# Patient Record
Sex: MALE | Race: WHITE | Employment: UNEMPLOYED | URBAN - METROPOLITAN AREA
[De-identification: names, ages, dates, MRNs, and addresses within clinical notes are randomized per-mention and may not be internally consistent; named-entity substitution may affect disease eponyms.]

---

## 2022-08-23 ENCOUNTER — APPOINTMENT (OUTPATIENT)
Dept: CT IMAGING | Age: 42
End: 2022-08-23
Payer: COMMERCIAL

## 2022-08-23 ENCOUNTER — HOSPITAL ENCOUNTER (OUTPATIENT)
Age: 42
Setting detail: OBSERVATION
Discharge: HOME | End: 2022-08-24
Attending: EMERGENCY MEDICINE | Admitting: INTERNAL MEDICINE
Payer: COMMERCIAL

## 2022-08-23 ENCOUNTER — APPOINTMENT (OUTPATIENT)
Dept: GENERAL RADIOLOGY | Age: 42
End: 2022-08-23
Payer: COMMERCIAL

## 2022-08-23 DIAGNOSIS — R55 SYNCOPE AND COLLAPSE: Primary | ICD-10-CM

## 2022-08-23 LAB
ALBUMIN SERPL-MCNC: 4.8 G/DL (ref 3.5–4.6)
ALP BLD-CCNC: 92 U/L (ref 35–104)
ALT SERPL-CCNC: 52 U/L (ref 0–41)
ANION GAP SERPL CALCULATED.3IONS-SCNC: 9 MEQ/L (ref 9–15)
AST SERPL-CCNC: 27 U/L (ref 0–40)
BASOPHILS ABSOLUTE: 0 K/UL (ref 0–0.2)
BASOPHILS RELATIVE PERCENT: 0.4 %
BILIRUB SERPL-MCNC: 0.8 MG/DL (ref 0.2–0.7)
BUN BLDV-MCNC: 15 MG/DL (ref 6–20)
CALCIUM SERPL-MCNC: 9.1 MG/DL (ref 8.5–9.9)
CHLORIDE BLD-SCNC: 100 MEQ/L (ref 95–107)
CO2: 25 MEQ/L (ref 20–31)
CREAT SERPL-MCNC: 1.12 MG/DL (ref 0.7–1.2)
EOSINOPHILS ABSOLUTE: 0 K/UL (ref 0–0.7)
EOSINOPHILS RELATIVE PERCENT: 0.4 %
GFR AFRICAN AMERICAN: >60
GFR NON-AFRICAN AMERICAN: >60
GLOBULIN: 2.4 G/DL (ref 2.3–3.5)
GLUCOSE BLD-MCNC: 145 MG/DL (ref 70–99)
HCT VFR BLD CALC: 43.2 % (ref 42–52)
HEMOGLOBIN: 15 G/DL (ref 14–18)
LV EF: 55 %
LVEF MODALITY: NORMAL
LYMPHOCYTES ABSOLUTE: 0.5 K/UL (ref 1–4.8)
LYMPHOCYTES RELATIVE PERCENT: 8.4 %
MCH RBC QN AUTO: 29.4 PG (ref 27–31.3)
MCHC RBC AUTO-ENTMCNC: 34.7 % (ref 33–37)
MCV RBC AUTO: 84.9 FL (ref 80–100)
MONOCYTES ABSOLUTE: 0.5 K/UL (ref 0.2–0.8)
MONOCYTES RELATIVE PERCENT: 7.4 %
NEUTROPHILS ABSOLUTE: 5.1 K/UL (ref 1.4–6.5)
NEUTROPHILS RELATIVE PERCENT: 83.4 %
PDW BLD-RTO: 13.8 % (ref 11.5–14.5)
PLATELET # BLD: 204 K/UL (ref 130–400)
POTASSIUM SERPL-SCNC: 4.5 MEQ/L (ref 3.4–4.9)
RBC # BLD: 5.09 M/UL (ref 4.7–6.1)
SODIUM BLD-SCNC: 134 MEQ/L (ref 135–144)
TOTAL PROTEIN: 7.2 G/DL (ref 6.3–8)
TROPONIN: <0.01 NG/ML (ref 0–0.01)
WBC # BLD: 6.1 K/UL (ref 4.8–10.8)

## 2022-08-23 PROCEDURE — G0378 HOSPITAL OBSERVATION PER HR: HCPCS

## 2022-08-23 PROCEDURE — 85025 COMPLETE CBC W/AUTO DIFF WBC: CPT

## 2022-08-23 PROCEDURE — 96372 THER/PROPH/DIAG INJ SC/IM: CPT

## 2022-08-23 PROCEDURE — 80053 COMPREHEN METABOLIC PANEL: CPT

## 2022-08-23 PROCEDURE — 93005 ELECTROCARDIOGRAM TRACING: CPT | Performed by: EMERGENCY MEDICINE

## 2022-08-23 PROCEDURE — 93306 TTE W/DOPPLER COMPLETE: CPT

## 2022-08-23 PROCEDURE — 36415 COLL VENOUS BLD VENIPUNCTURE: CPT

## 2022-08-23 PROCEDURE — 71045 X-RAY EXAM CHEST 1 VIEW: CPT

## 2022-08-23 PROCEDURE — 84484 ASSAY OF TROPONIN QUANT: CPT

## 2022-08-23 PROCEDURE — 2580000003 HC RX 258: Performed by: INTERNAL MEDICINE

## 2022-08-23 PROCEDURE — 99285 EMERGENCY DEPT VISIT HI MDM: CPT

## 2022-08-23 PROCEDURE — 72125 CT NECK SPINE W/O DYE: CPT

## 2022-08-23 PROCEDURE — 99220 PR INITIAL OBSERVATION CARE/DAY 70 MINUTES: CPT | Performed by: INTERNAL MEDICINE

## 2022-08-23 PROCEDURE — 6360000002 HC RX W HCPCS: Performed by: INTERNAL MEDICINE

## 2022-08-23 PROCEDURE — 96361 HYDRATE IV INFUSION ADD-ON: CPT

## 2022-08-23 PROCEDURE — 70450 CT HEAD/BRAIN W/O DYE: CPT

## 2022-08-23 PROCEDURE — 96360 HYDRATION IV INFUSION INIT: CPT

## 2022-08-23 RX ORDER — SODIUM CHLORIDE 9 MG/ML
INJECTION, SOLUTION INTRAVENOUS CONTINUOUS
Status: DISPENSED | OUTPATIENT
Start: 2022-08-23 | End: 2022-08-24

## 2022-08-23 RX ORDER — ONDANSETRON 4 MG/1
4 TABLET, ORALLY DISINTEGRATING ORAL EVERY 8 HOURS PRN
Status: DISCONTINUED | OUTPATIENT
Start: 2022-08-23 | End: 2022-08-24 | Stop reason: HOSPADM

## 2022-08-23 RX ORDER — SODIUM CHLORIDE 9 MG/ML
INJECTION, SOLUTION INTRAVENOUS PRN
Status: DISCONTINUED | OUTPATIENT
Start: 2022-08-23 | End: 2022-08-24 | Stop reason: HOSPADM

## 2022-08-23 RX ORDER — PHENTERMINE HYDROCHLORIDE 37.5 MG/1
37.5 TABLET ORAL
Status: ON HOLD | COMMUNITY
End: 2022-08-24 | Stop reason: HOSPADM

## 2022-08-23 RX ORDER — SODIUM CHLORIDE 0.9 % (FLUSH) 0.9 %
5-40 SYRINGE (ML) INJECTION PRN
Status: DISCONTINUED | OUTPATIENT
Start: 2022-08-23 | End: 2022-08-24 | Stop reason: HOSPADM

## 2022-08-23 RX ORDER — SODIUM CHLORIDE 0.9 % (FLUSH) 0.9 %
5-40 SYRINGE (ML) INJECTION EVERY 12 HOURS SCHEDULED
Status: DISCONTINUED | OUTPATIENT
Start: 2022-08-23 | End: 2022-08-24 | Stop reason: HOSPADM

## 2022-08-23 RX ORDER — ONDANSETRON 2 MG/ML
4 INJECTION INTRAMUSCULAR; INTRAVENOUS EVERY 6 HOURS PRN
Status: DISCONTINUED | OUTPATIENT
Start: 2022-08-23 | End: 2022-08-24 | Stop reason: HOSPADM

## 2022-08-23 RX ORDER — ENOXAPARIN SODIUM 100 MG/ML
30 INJECTION SUBCUTANEOUS 2 TIMES DAILY
Status: DISCONTINUED | OUTPATIENT
Start: 2022-08-23 | End: 2022-08-24 | Stop reason: HOSPADM

## 2022-08-23 RX ORDER — ACETAMINOPHEN 325 MG/1
650 TABLET ORAL EVERY 4 HOURS PRN
Status: DISCONTINUED | OUTPATIENT
Start: 2022-08-23 | End: 2022-08-24 | Stop reason: HOSPADM

## 2022-08-23 RX ADMIN — ENOXAPARIN SODIUM 30 MG: 100 INJECTION SUBCUTANEOUS at 17:46

## 2022-08-23 RX ADMIN — SODIUM CHLORIDE: 9 INJECTION, SOLUTION INTRAVENOUS at 17:49

## 2022-08-23 RX ADMIN — SODIUM CHLORIDE, PRESERVATIVE FREE 10 ML: 5 INJECTION INTRAVENOUS at 19:58

## 2022-08-23 ASSESSMENT — ENCOUNTER SYMPTOMS
WHEEZING: 0
VOMITING: 0
ABDOMINAL PAIN: 0
EYES NEGATIVE: 1
GASTROINTESTINAL NEGATIVE: 1
SHORTNESS OF BREATH: 0
ALLERGIC/IMMUNOLOGIC NEGATIVE: 1
NAUSEA: 0

## 2022-08-23 ASSESSMENT — PAIN SCALES - GENERAL
PAINLEVEL_OUTOF10: 0
PAINLEVEL_OUTOF10: 5

## 2022-08-23 ASSESSMENT — PAIN DESCRIPTION - LOCATION: LOCATION: NECK

## 2022-08-23 ASSESSMENT — PAIN - FUNCTIONAL ASSESSMENT: PAIN_FUNCTIONAL_ASSESSMENT: 0-10

## 2022-08-23 NOTE — CARE COORDINATION
Pt reports he has health coverage with [facility], no card brought in, wife has covid and he is asymptomatic.  Electronically signed by Yamile Martínez RN on 8/23/2022 at 12:00 PM

## 2022-08-23 NOTE — ED PROVIDER NOTES
[address] [facility] ED  EMERGENCY DEPARTMENT ENCOUNTER      Pt Name: George Jenkins  MRN: [id number]  [place] 1980  Date of evaluation: 8/23/2022  Provider: Bean Carter MD    [address]       Chief Complaint   Patient presents with    Loss of Consciousness     Pt was sitting on toilet and then remembers waking up on the floor with nose bleeding. Pt denies blood thinners. Pt went to go to the bathroom again because he felt nauseated and started to fall forward and hit his head on dresser. HISTORY OF PRESENT ILLNESS   (Location/Symptom, Timing/Onset, Context/Setting, Quality, Duration, Modifying Factors, Severity)  Note limiting factors. 68-year-old male presenting with syncopal episode x2. Patient states that he was on the toilet when he suddenly passed out and was found on the ground. He had no prodrome. States after this he got up and again shortly afterwards had a syncopal event. This time when he fell he hit his head on the ground. There was loss of consciousness both times. Notes no specific pains presently. Denies any cardiac history. No hx of this in the best.  Otherwise feeling well. Nursing Notes were reviewed. REVIEW OF SYSTEMS    (2-9 systems for level 4, 10 or more for level 5)     Review of Systems   Neurological:  Positive for syncope. All other systems reviewed and are negative. Except as noted above the remainder of the review of systems was reviewed and negative. PAST MEDICAL HISTORY   History reviewed. No pertinent past medical history. SURGICAL HISTORY       Past Surgical History:   Procedure Laterality Date    EAR SURGERY      TONSILLECTOMY AND ADENOIDECTOMY      WISDOM TOOTH EXTRACTION           CURRENT MEDICATIONS       Previous Medications    No medications on file       ALLERGIES     Patient has no known allergies. FAMILY HISTORY     History reviewed. No pertinent family history.        SOCIAL HISTORY       Social History Socioeconomic History    Marital status:      Spouse name: None    Number of children: None    Years of education: None    Highest education level: None   Tobacco Use    Smoking status: Never    Smokeless tobacco: Never   Substance and Sexual Activity    Alcohol use: Not Currently    Drug use: Never       SCREENINGS    Anh Coma Scale  Eye Opening: Spontaneous  Best Verbal Response: Oriented  Best Motor Response: Obeys commands  [place] Coma Scale Score: 15          PHYSICAL EXAM    (up to 7 for level 4, 8 or more for level 5)     ED Triage Vitals   BP Temp Temp Source Heart Rate Resp SpO2 Height Weight   08/23/22 1027 08/23/22 1030 08/23/22 1030 08/23/22 1027 08/23/22 1027 08/23/22 1027 08/23/22 1030 08/23/22 1030   128/78 98.1 °F (36.7 °C) Oral 91 16 96 % 5' 11\" (1.803 m) 289 lb (131.1 kg)       Physical Exam  Vitals and nursing note reviewed. Constitutional:       General: He is not in acute distress. Appearance: Normal appearance. He is well-developed. He is not ill-appearing. HENT:      Head: Normocephalic. Comments: Small abrasion s/p fall     Mouth/Throat:      Mouth: Mucous membranes are moist.      Pharynx: Oropharynx is clear. Eyes:      Extraocular Movements: Extraocular movements intact. Conjunctiva/sclera: Conjunctivae normal.      Pupils: Pupils are equal, round, and reactive to light. Cardiovascular:      Rate and Rhythm: Normal rate and regular rhythm. Pulmonary:      Effort: Pulmonary effort is normal.      Breath sounds: Normal breath sounds. Abdominal:      General: Bowel sounds are normal.      Palpations: Abdomen is soft. Tenderness: There is no abdominal tenderness. Musculoskeletal:         General: No deformity. Normal range of motion. Cervical back: Normal range of motion and neck supple. Skin:     General: Skin is warm and dry. Capillary Refill: Capillary refill takes less than 2 seconds.    Neurological:      General: No focal deficit present. Mental Status: He is alert and oriented to person, place, and time. Mental status is at baseline. Cranial Nerves: No cranial nerve deficit. Psychiatric:         Thought Content: Thought content normal.       DIAGNOSTIC RESULTS     EKG: All EKG's are interpreted by the Emergency Department Physician who either signs or Co-signs this chart in the absence of a cardiologist.    NSR, rate 100, normal intervals, no ST elevation/ depression    RADIOLOGY:   Non-plain film images such as CT, Ultrasound and MRI are read by the radiologist. Plain radiographic images are visualized and preliminarily interpreted by the emergency physician with the below findings:    CXR- neg acute    Interpretation per the Radiologist below, if available at the time of this note:    CT Head WO Contrast   Final Result   Impression:      Chronic right mastoiditis. Left maxillary sinusitis. All CT scans at this facility use dose modulation, iterative reconstruction, and/or weight based dosing when appropriate to reduce radiation dose to as low as reasonably achievable. CT CERVICAL SPINE WO CONTRAST   Final Result      No fracture. No malalignment. Loss cervical lordosis may be secondary to muscle spasm versus patient positioning. All CT scans at this facility use dose modulation, iterative reconstruction, and/or weight based dosing when appropriate to reduce radiation dose to as low as reasonably achievable.                XR CHEST PORTABLE    (Results Pending)       LABS:  Labs Reviewed   COMPREHENSIVE METABOLIC PANEL - Abnormal; Notable for the following components:       Result Value    Sodium 134 (*)     Glucose 145 (*)     Albumin 4.8 (*)     Total Bilirubin 0.8 (*)     ALT 52 (*)     All other components within normal limits   CBC WITH AUTO DIFFERENTIAL - Abnormal; Notable for the following components:    Lymphocytes Absolute 0.5 (*)     All other components within normal limits

## 2022-08-23 NOTE — CARE COORDINATION
08/23/22    From:HOME W SPOUSE & 3 CHILDREN INDEPENDENT    Admit: SYNCOPE & COLLAPSE X 2 HEAD STRIKE NO LOC    PMH:NONE    Anticipated Discharge [facility]    Patient Mobility or PT/OT ordered:N/A INDEPENDENT    Consults: CARDIOLOGY (ADMITTING)    Clinical: SR--RA, BS --145,  CT BRAIN CHRONIC MASTOIDITIS    Barriers to Discharge:  CARDIOLOGY NEEDS TO SEE  ECHO NEEDS TO BE DONE    Assessments: CMI DONE

## 2022-08-23 NOTE — H&P
Chief Complaint   Patient presents with    Loss of Consciousness     Pt was sitting on toilet and then remembers waking up on the floor with nose bleeding. Pt denies blood thinners. Pt went to go to the bathroom again because he felt nauseated and started to fall forward and hit his head on dresser. Patient is a 38 y.o. male who presents with a chief complaint of Syncope. Patient is followed on a regular basis by Dr. Roro Valle MD. patient with no previous past medical history who presented with syncopal episode x2 today. Apparently he was sitting on the toilet urinating and a  He was on the floor. Patient was also a few hours later he was feeling nauseated and about to vomit and was going to the bathroom and had another syncopal episode. No significant facial trauma. No previous history of syncope. Denies any chest pain chest pressure heaviness. No shortness of breath. No seizure-like activity. No loss of bowel or bladder function. He denies history of diabetes, hypertension, hyperlipidemia or tobacco use. No history of CVA. Initial cardiac enzymes negative  Patient is on Adipex for weight loss and has lost over 30 pounds. EKG shows normal sinus rhythm, no ischemia        History reviewed. No pertinent past medical history. Patient Active Problem List   Diagnosis    Syncope and collapse       Past Surgical History:   Procedure Laterality Date    EAR SURGERY      TONSILLECTOMY AND ADENOIDECTOMY      WISDOM TOOTH EXTRACTION         Social History     Socioeconomic History    Marital status:      Spouse name: None    Number of children: None    Years of education: None    Highest education level: None   Tobacco Use    Smoking status: Never    Smokeless tobacco: Never   Substance and Sexual Activity    Alcohol use: Not Currently    Drug use: Never       History reviewed. No pertinent family history.     Current Facility-Administered Medications   Medication Dose Route Frequency Provider Last Rate Last Admin    sodium chloride flush 0.9 % injection 5-40 mL  5-40 mL IntraVENous 2 times per day Edis J Holiday, DO        sodium chloride flush 0.9 % injection 5-40 mL  5-40 mL IntraVENous PRN [facility] Holiday, DO        0.9 % sodium chloride infusion   IntraVENous PRN [facility] Holiday, DO        enoxaparin Sodium (LOVENOX) injection 30 mg  30 mg SubCUTAneous BID [facility] Holiday, DO        acetaminophen (TYLENOL) tablet 650 mg  650 mg Oral Q4H PRN [facility] Holiday, DO        ondansetron (ZOFRAN-ODT) disintegrating tablet 4 mg  4 mg Oral Q8H PRN [facility] Holiday, DO        Or    ondansetron (ZOFRAN) injection 4 mg  4 mg IntraVENous Q6H PRN [facility] Holiday, DO           ALLERGIES: Patient has no known allergies. Review of Systems   Constitutional: Negative. Negative for chills and fever. HENT: Negative. Eyes: Negative. Respiratory:  Negative for shortness of breath and wheezing. Cardiovascular:  Negative for chest pain, palpitations and leg swelling. Gastrointestinal: Negative. Negative for abdominal pain, nausea and vomiting. Genitourinary: Negative. Musculoskeletal: Negative. Skin: Negative. Negative for rash. Allergic/Immunologic: Negative. Neurological:  Positive for syncope. Negative for dizziness, weakness and headaches. Hematological: Negative. Psychiatric/Behavioral: Negative. VITALS:  Blood pressure 113/81, pulse 98, temperature 98.1 °F (36.7 °C), temperature source Oral, resp. rate 20, height 5' 11\" (1.803 m), weight 289 lb (131.1 kg), SpO2 97 %. Body mass index is 40.31 kg/m². Physical Exam  Vitals and nursing note reviewed. Constitutional:       Appearance: He is well-developed. He is obese. He is not diaphoretic. HENT:      Head: Normocephalic and atraumatic. Eyes:      Pupils: Pupils are equal, round, and reactive to light. Neck:      Thyroid: No thyromegaly. Vascular: No JVD. Trachea: No tracheal deviation.    Cardiovascular:      Rate and Rhythm: Normal rate and regular rhythm. Chest Wall: PMI is not displaced. Pulses: Intact distal pulses. Heart sounds: Normal heart sounds. Heart sounds not distant. No murmur heard. No friction rub. No gallop. No S3 sounds. Pulmonary:      Effort: No respiratory distress. Breath sounds: No wheezing or rales. Chest:      Chest wall: No tenderness. Abdominal:      General: Bowel sounds are normal. There is no distension. Palpations: Abdomen is soft. There is no mass. Tenderness: There is no abdominal tenderness. There is no guarding or rebound. Musculoskeletal:         General: Normal range of motion. Cervical back: Normal range of motion and neck supple. Skin:     General: Skin is warm and dry. Coloration: Skin is not pale. Findings: No erythema or rash. Neurological:      Mental Status: He is alert and oriented to person, place, and time. Cranial Nerves: No cranial nerve deficit. Psychiatric:         Behavior: Behavior normal.         Thought Content:  Thought content normal.         Judgment: Judgment normal.       LABS:  Recent Results (from the past 24 hour(s))   EKG 12 Lead - Chest Pain    Collection Time: 08/23/22 10:42 AM   Result Value Ref Range    Ventricular Rate 100 BPM    Atrial Rate 100 BPM    P-R Interval 182 ms    QRS Duration 94 ms    Q-T Interval 346 ms    QTc Calculation (Bazett) 446 ms    P Axis 45 degrees    R Axis 36 degrees    T Axis 37 degrees   Comprehensive Metabolic Panel    Collection Time: 08/23/22 10:45 AM   Result Value Ref Range    Sodium 134 (L) 135 - 144 mEq/L    Potassium 4.5 3.4 - 4.9 mEq/L    Chloride 100 95 - 107 mEq/L    CO2 25 20 - 31 mEq/L    Anion Gap 9 9 - 15 mEq/L    Glucose 145 (H) 70 - 99 mg/dL    BUN 15 6 - 20 mg/dL    Creatinine 1.12 0.70 - 1.20 mg/dL    GFR Non-African American >60.0 >60    GFR  >60.0 >60    Calcium 9.1 8.5 - 9.9 mg/dL    Total Protein 7.2 6.3 - 8.0 g/dL    Albumin 4.8 (H) 3.5 - 4.6 g/dL    Total Bilirubin 0.8 (H) 0.2 - 0.7 mg/dL    Alkaline Phosphatase 92 35 - 104 U/L    ALT 52 (H) 0 - 41 U/L    AST 27 0 - 40 U/L    Globulin 2.4 2.3 - 3.5 g/dL   CBC with Auto Differential    Collection Time: 08/23/22 10:45 AM   Result Value Ref Range    WBC 6.1 4.8 - 10.8 K/uL    RBC 5.09 4.70 - 6.10 M/uL    Hemoglobin 15.0 14.0 - 18.0 g/dL    Hematocrit 43.2 42.0 - 52.0 %    MCV 84.9 80.0 - 100.0 fL    MCH 29.4 27.0 - 31.3 pg    MCHC 34.7 33.0 - 37.0 %    RDW 13.8 11.5 - 14.5 %    Platelets 696 014 - 001 K/uL    Neutrophils % 83.4 %    Lymphocytes % 8.4 %    Monocytes % 7.4 %    Eosinophils % 0.4 %    Basophils % 0.4 %    Neutrophils Absolute 5.1 1.4 - 6.5 K/uL    Lymphocytes Absolute 0.5 (L) 1.0 - 4.8 K/uL    Monocytes Absolute 0.5 0.2 - 0.8 K/uL    Eosinophils Absolute 0.0 0.0 - 0.7 K/uL    Basophils Absolute 0.0 0.0 - 0.2 K/uL   Troponin    Collection Time: 08/23/22 10:45 AM   Result Value Ref Range    Troponin <0.010 0.000 - 0.010 ng/mL     Troponin:   Lab Results   Component Value Date/Time    TROPONINI <0.010 08/23/2022 10:45 AM       EKG: normal sinus rhythm, nonspecific ST and T waves changes      ASSESSMENT:    Active Hospital Problems    Diagnosis Date Noted    Syncope and collapse [R55] 08/23/2022     Priority: Medium     Syncope. Likely vasovagal/situational.  Rule out arrhythmia, rule out valvular heart disease rule out cardiac ischemia. Morbid obesity    Hyponatremia    PLAN:   As always, aggressive risk factor modification is strongly recommended. We should adhere to the JNC VIII guidelines for HTN management and the NCEPATP III guidelines for LDL-C management. Check 2D echocardiogram  Monitor on telemetry  Coronary evaluation as outpatient. No signs of ACS/angina  Will discontinue Adipex for now  Maintain potassium between 4-5 and magnesium greater than 2  IV fluids.   GI/DVT prophylaxis  Further recommendations to follow

## 2022-08-23 NOTE — CARE COORDINATION
[facility] Case Management Initial Discharge Assessment    Met with Patient to discuss discharge plan. PCP: Dipika Newton MD                                Date of Last Visit: 1 month ago    [facility] Patient: No        [facility] Notified: no    If no PCP, list provided? Declined    Discharge Planning    Living Arrangements: independently at home    Who do you live with? SPOUSE 3 CHILDREN AGES [birth year]    Who helps you with your care:  self    If lives at home:     Do you have any barriers navigating in your home? yes -     Patient can perform ADL? Yes INDEPENDENT WITH ALL ADL'S DRIVES, IS A \"STAY AT HOME DAD\"     Current Services (outpatient and in home) :      Dialysis:     Is transportation available to get to your appointments? YES PT DRIVES    DME Equipment:  no    Respiratory equipment: None    Respiratory provider:  no     Pharmacy:  yes -[phone]    Consult with Medication Assistance Program?  No      Patient agreeable to [facility]? N/A    Patient agreeable to SNF/Rehab? N/A    Other discharge needs identified? N/A    Does Patient Have a High-Risk for Readmission Diagnosis (CHF, PN, MI, COPD)? No      Initial Discharge Plan? (Note: please see concurrent daily documentation for any updates after initial note).     RETURN HOME W FAMILY NO NEEDS    Readmission Risk              Risk of Unplanned Readmission:  0         Electronically signed by Saurav Callejas RN on 8/23/2022 at 11:27 AM

## 2022-08-24 VITALS
HEIGHT: 71 IN | OXYGEN SATURATION: 96 % | TEMPERATURE: 97.8 F | WEIGHT: 289 LBS | BODY MASS INDEX: 40.46 KG/M2 | SYSTOLIC BLOOD PRESSURE: 138 MMHG | DIASTOLIC BLOOD PRESSURE: 81 MMHG | RESPIRATION RATE: 18 BRPM | HEART RATE: 83 BPM

## 2022-08-24 LAB
ANION GAP SERPL CALCULATED.3IONS-SCNC: 9 MEQ/L (ref 9–15)
BUN BLDV-MCNC: 14 MG/DL (ref 6–20)
CALCIUM SERPL-MCNC: 8.9 MG/DL (ref 8.5–9.9)
CHLORIDE BLD-SCNC: 103 MEQ/L (ref 95–107)
CO2: 26 MEQ/L (ref 20–31)
CREAT SERPL-MCNC: 1.02 MG/DL (ref 0.7–1.2)
GFR AFRICAN AMERICAN: >60
GFR NON-AFRICAN AMERICAN: >60
GLUCOSE BLD-MCNC: 107 MG/DL (ref 70–99)
POTASSIUM SERPL-SCNC: 4.2 MEQ/L (ref 3.4–4.9)
SODIUM BLD-SCNC: 138 MEQ/L (ref 135–144)

## 2022-08-24 PROCEDURE — G0378 HOSPITAL OBSERVATION PER HR: HCPCS

## 2022-08-24 PROCEDURE — 96372 THER/PROPH/DIAG INJ SC/IM: CPT

## 2022-08-24 PROCEDURE — 6360000002 HC RX W HCPCS: Performed by: INTERNAL MEDICINE

## 2022-08-24 PROCEDURE — 99217 PR OBSERVATION CARE DISCHARGE MANAGEMENT: CPT | Performed by: INTERNAL MEDICINE

## 2022-08-24 PROCEDURE — 96361 HYDRATE IV INFUSION ADD-ON: CPT

## 2022-08-24 PROCEDURE — 36415 COLL VENOUS BLD VENIPUNCTURE: CPT

## 2022-08-24 PROCEDURE — 2580000003 HC RX 258: Performed by: INTERNAL MEDICINE

## 2022-08-24 PROCEDURE — 80048 BASIC METABOLIC PNL TOTAL CA: CPT

## 2022-08-24 RX ADMIN — ENOXAPARIN SODIUM 30 MG: 100 INJECTION SUBCUTANEOUS at 08:32

## 2022-08-24 RX ADMIN — SODIUM CHLORIDE, PRESERVATIVE FREE 10 ML: 5 INJECTION INTRAVENOUS at 08:32

## 2022-08-24 ASSESSMENT — PAIN SCALES - GENERAL
PAINLEVEL_OUTOF10: 0
PAINLEVEL_OUTOF10: 0

## 2022-08-24 NOTE — DISCHARGE SUMMARY
Discharge Summary    Date: 8/24/2022  Patient Name: Tiesha Mckeon    YOB: 1980     Age: 38 y.o. Admit Date: 8/23/2022  Discharge Date: 8/24/2022  Discharge Condition: Good    Admission Diagnosis  Syncope and collapse [R55]      Discharge Diagnosis  Principal Problem:    Syncope and collapse  Resolved Problems:    * No resolved hospital problems. [facility] Stay  Narrative of Hospital Course:  Patient presented with recurrent syncopal episode. CT scan of the brain was negative in the emergency department. Echocardiogram with normal LV function. No alarms on telemetry. His Adipex was discontinued and given IV fluids. Orthostatics were negative. He was discharged home in stable and satisfactory condition. He is instructed to follow-up with his family doctor in 1 to 2 weeks. Consider outpatient event monitor if symptoms recur. Consultants:  None    Surgeries/procedures Performed:      Treatments:    Cardiac Medications        Discharge Plan/Disposition:  Home    Hospital/Incidental Findings Requiring Follow Up:    Patient Instructions:    Diet:    Activity:Activity as Tolerated  For number of days (if applicable): Other Instructions:    Provider Follow-Up:   No follow-ups on file.      Significant Diagnostic Studies:    Recent Labs:  Admission on 08/23/2022  Ventricular Rate                              Date: 08/23/2022  Value: 100         Ref range: BPM                Status: Preliminary  Atrial Rate                                   Date: 08/23/2022  Value: 100         Ref range: BPM                Status: Preliminary  P-R Interval                                  Date: 08/23/2022  Value: 182         Ref range: ms                 Status: Preliminary  QRS Duration                                  Date: 08/23/2022  Value: 94          Ref range: ms                 Status: Preliminary  Q-T Interval                                  Date: 08/23/2022  Value: 346         Ref range: ms 08/23/2022  Value: >60.0       Ref range: >60                Status: Final                Comment: >60 mL/min/1.73m2 EGFR, calc. for ages 25 and older using the  MDRD formula (not corrected for weight), is valid for stable  renal function.     Calcium                                       Date: 08/23/2022  Value: 9.1         Ref range: 8.5 - 9.9 mg/dL    Status: Final  Total Protein                                 Date: 08/23/2022  Value: 7.2         Ref range: 6.3 - 8.0 g/dL     Status: Final  Albumin                                       Date: 08/23/2022  Value: 4.8 (A)     Ref range: 3.5 - 4.6 g/dL     Status: Final  Total Bilirubin                               Date: 08/23/2022  Value: 0.8 (A)     Ref range: 0.2 - 0.7 mg/dL    Status: Final  Alkaline Phosphatase                          Date: 08/23/2022  Value: 92          Ref range: 35 - 104 U/L       Status: Final  ALT                                           Date: 08/23/2022  Value: 52 (A)      Ref range: 0 - 41 U/L         Status: Final  AST                                           Date: 08/23/2022  Value: 27          Ref range: 0 - 40 U/L         Status: Final  Globulin                                      Date: 08/23/2022  Value: 2.4         Ref range: 2.3 - 3.5 g/dL     Status: Final  WBC                                           Date: 08/23/2022  Value: 6.1         Ref range: 4.8 - 10.8 K/uL    Status: Final  RBC                                           Date: 08/23/2022  Value: 5.09        Ref range: 4.70 - 6.10 M/uL   Status: Final  Hemoglobin                                    Date: 08/23/2022  Value: 15.0        Ref range: 14.0 - 18.0 g/dL   Status: Final  Hematocrit                                    Date: 08/23/2022  Value: 43.2        Ref range: 42.0 - 52.0 %      Status: Final  MCV                                           Date: 08/23/2022  Value: 84.9        Ref range: 80.0 - 100.0 fL    Status: Final  MCH Date: 08/23/2022  Value: 29.4        Ref range: 27.0 - 31.3 pg     Status: Final  MCHC                                          Date: 08/23/2022  Value: 34.7        Ref range: 33.0 - 37.0 %      Status: Final  RDW                                           Date: 08/23/2022  Value: 13.8        Ref range: 11.5 - 14.5 %      Status: Final  Platelets                                     Date: 08/23/2022  Value: 204         Ref range: 130 - 400 K/uL     Status: Final  Neutrophils %                                 Date: 08/23/2022  Value: 83.4        Ref range: %                  Status: Final  Lymphocytes %                                 Date: 08/23/2022  Value: 8.4         Ref range: %                  Status: Final  Monocytes %                                   Date: 08/23/2022  Value: 7.4         Ref range: %                  Status: Final  Eosinophils %                                 Date: 08/23/2022  Value: 0.4         Ref range: %                  Status: Final  Basophils %                                   Date: 08/23/2022  Value: 0.4         Ref range: %                  Status: Final  Neutrophils Absolute                          Date: 08/23/2022  Value: 5.1         Ref range: 1.4 - 6.5 K/uL     Status: Final  Lymphocytes Absolute                          Date: 08/23/2022  Value: 0.5 (A)     Ref range: 1.0 - 4.8 K/uL     Status: Final  Monocytes Absolute                            Date: 08/23/2022  Value: 0.5         Ref range: 0.2 - 0.8 K/uL     Status: Final  Eosinophils Absolute                          Date: 08/23/2022  Value: 0.0         Ref range: 0.0 - 0.7 K/uL     Status: Final  Basophils Absolute                            Date: 08/23/2022  Value: 0.0         Ref range: 0.0 - 0.2 K/uL     Status: Final  Troponin                                      Date: 08/23/2022  Value: <0.010      Ref range: 0.000 - 0.010 ng*  Status: Final                Comment: Methodology by Troponin T.   Sodium Date: 08/24/2022  Value: 138         Ref range: 135 - 144 mEq/L    Status: Final  Potassium                                     Date: 08/24/2022  Value: 4.2         Ref range: 3.4 - 4.9 mEq/L    Status: Final  Chloride                                      Date: 08/24/2022  Value: 103         Ref range: 95 - 107 mEq/L     Status: Final  CO2                                           Date: 08/24/2022  Value: 26          Ref range: 20 - 31 mEq/L      Status: Final  Anion Gap                                     Date: 08/24/2022  Value: 9           Ref range: 9 - 15 mEq/L       Status: Final  Glucose                                       Date: 08/24/2022  Value: 107 (A)     Ref range: 70 - 99 mg/dL      Status: Final  BUN                                           Date: 08/24/2022  Value: 14          Ref range: 6 - 20 mg/dL       Status: Final  Creatinine                                    Date: 08/24/2022  Value: 1.02        Ref range: 0.70 - 1.20 mg/dL  Status: Final  GFR Non-                      Date: 08/24/2022  Value: >60.0       Ref range: >60                Status: Final                Comment: >60 mL/min/1.73m2 EGFR, calc. for ages 25 and older using the  MDRD formula (not corrected for weight), is valid for stable  renal function. GFR                           Date: 08/24/2022  Value: >60.0       Ref range: >60                Status: Final                Comment: >60 mL/min/1.73m2 EGFR, calc. for ages 25 and older using the  MDRD formula (not corrected for weight), is valid for stable  renal function. Calcium                                       Date: 08/24/2022  Value: 8.9         Ref range: 8.5 - 9.9 mg/dL    Status: Final  ------------    Radiology last 7 days:  CT Head WO Contrast    Result Date: 8/23/2022  Impression: Chronic right mastoiditis. Left maxillary sinusitis.  All CT scans at this facility use dose modulation, iterative reconstruction, and/or weight based dosing when appropriate to reduce radiation dose to as low as reasonably achievable. CT CERVICAL SPINE WO CONTRAST    Result Date: 8/23/2022  No fracture. No malalignment. Loss cervical lordosis may be secondary to muscle spasm versus patient positioning. All CT scans at this facility use dose modulation, iterative reconstruction, and/or weight based dosing when appropriate to reduce radiation dose to as low as reasonably achievable. XR CHEST PORTABLE    Result Date: 8/23/2022  NO SIGNIFICANT ACUTE INTRATHORACIC ABNORMALITY. [unfilled]    Discharge Medications    Current Discharge Medication List        Current Discharge Medication List        Current Discharge Medication List        Current Discharge Medication List    STOP taking these medications    phentermine (ADIPEX-P) 37.5 MG tablet  Comments:  Reason for Stopping:          Time Spent on Discharge:  minutes were spent in patient examination, evaluation, counseling as well as medication reconciliation, prescriptions for required medications, discharge plan, and follow up.     Electronically signed by Cristobal Bolaños DO on 8/24/22 at 11:14 AM EDT

## 2022-08-24 NOTE — PLAN OF CARE
Problem: Pain  Goal: Verbalizes/displays adequate comfort level or baseline comfort level  Outcome: Progressing  Flowsheets (Taken 8/23/2022 1935)  Verbalizes/displays adequate comfort level or baseline comfort level: Encourage patient to monitor pain and request assistance     Problem: Safety - Adult  Goal: Free from fall injury  Outcome: Progressing

## 2022-08-24 NOTE — PLAN OF CARE
Problem: Pain  Goal: Verbalizes/displays adequate comfort level or baseline comfort level  8/24/2022 0936 by Martell Jefferson RN  Outcome: Progressing  8/23/2022 2058 by Jose L Magallanes RN  Outcome: Progressing  Flowsheets (Taken 8/23/2022 1935)  Verbalizes/displays adequate comfort level or baseline comfort level: Encourage patient to monitor pain and request assistance     Problem: Safety - Adult  Goal: Free from fall injury  8/24/2022 0936 by Martell Jefferson RN  Outcome: Progressing  8/23/2022 2058 by Jose L Magallanes RN  Outcome: Progressing

## 2022-08-24 NOTE — PROGRESS NOTES
Pt assessed this morning. Alert and oriented x 4 able to make needs known. In bed eating breakfast, pt states that he feels well today. Voiding normally using walker up to bathroom. Remains on telemetry NSR,  denies pain, discomfort or dizziness.

## 2022-08-25 LAB
EKG ATRIAL RATE: 100 BPM
EKG P AXIS: 45 DEGREES
EKG P-R INTERVAL: 182 MS
EKG Q-T INTERVAL: 346 MS
EKG QRS DURATION: 94 MS
EKG QTC CALCULATION (BAZETT): 446 MS
EKG R AXIS: 36 DEGREES
EKG T AXIS: 37 DEGREES
EKG VENTRICULAR RATE: 100 BPM

## 2025-08-12 ENCOUNTER — OFFICE VISIT (OUTPATIENT)
Dept: URGENT CARE | Age: 45
End: 2025-08-12
Payer: COMMERCIAL

## 2025-08-12 VITALS
RESPIRATION RATE: 20 BRPM | SYSTOLIC BLOOD PRESSURE: 128 MMHG | HEART RATE: 81 BPM | HEIGHT: 71 IN | WEIGHT: 245 LBS | BODY MASS INDEX: 34.3 KG/M2 | TEMPERATURE: 98.5 F | DIASTOLIC BLOOD PRESSURE: 86 MMHG | OXYGEN SATURATION: 97 %

## 2025-08-12 DIAGNOSIS — T63.481A LOCAL REACTION TO INSECT STING, ACCIDENTAL OR UNINTENTIONAL, INITIAL ENCOUNTER: Primary | ICD-10-CM

## 2025-08-12 PROCEDURE — 3008F BODY MASS INDEX DOCD: CPT | Performed by: PHYSICIAN ASSISTANT

## 2025-08-12 PROCEDURE — 99203 OFFICE O/P NEW LOW 30 MIN: CPT | Performed by: PHYSICIAN ASSISTANT

## 2025-08-12 RX ORDER — PREDNISONE 20 MG/1
20 TABLET ORAL 2 TIMES DAILY
Qty: 14 TABLET | Refills: 0 | Status: SHIPPED | OUTPATIENT
Start: 2025-08-12 | End: 2025-08-19

## 2025-08-12 RX ORDER — FAMOTIDINE 20 MG/1
20 TABLET, FILM COATED ORAL 2 TIMES DAILY
Qty: 14 TABLET | Refills: 0 | Status: SHIPPED | OUTPATIENT
Start: 2025-08-12 | End: 2025-08-19

## 2025-08-12 RX ORDER — PHENTERMINE HYDROCHLORIDE 37.5 MG/1
37.5 TABLET ORAL
COMMUNITY
Start: 2025-07-22 | End: 2025-10-20

## 2025-08-12 ASSESSMENT — PAIN SCALES - GENERAL: PAINLEVEL_OUTOF10: 0-NO PAIN
